# Patient Record
Sex: FEMALE | Employment: UNEMPLOYED | URBAN - METROPOLITAN AREA
[De-identification: names, ages, dates, MRNs, and addresses within clinical notes are randomized per-mention and may not be internally consistent; named-entity substitution may affect disease eponyms.]

---

## 2021-11-01 PROBLEM — F41.9 ANXIETY: Status: RESOLVED | Noted: 2017-12-12 | Resolved: 2021-11-01

## 2021-11-01 PROBLEM — O98.512 COVID-19 AFFECTING PREGNANCY IN SECOND TRIMESTER: Status: ACTIVE | Noted: 2021-11-01

## 2021-11-01 PROBLEM — U07.1 COVID-19 AFFECTING PREGNANCY IN SECOND TRIMESTER: Status: ACTIVE | Noted: 2021-11-01

## 2021-11-01 PROBLEM — F41.9 ANXIETY: Status: ACTIVE | Noted: 2017-12-12

## 2021-11-15 PROBLEM — O09.90 SUPERVISION OF HIGH-RISK PREGNANCY: Status: ACTIVE | Noted: 2021-11-15

## 2021-11-18 PROBLEM — O43.192 MARGINAL INSERTION OF UMBILICAL CORD AFFECTING MANAGEMENT OF MOTHER IN SECOND TRIMESTER: Status: ACTIVE | Noted: 2021-11-18

## 2021-12-29 PROBLEM — O99.212 OBESITY AFFECTING PREGNANCY IN SECOND TRIMESTER: Status: ACTIVE | Noted: 2021-12-29

## 2021-12-30 PROBLEM — O40.2XX0 POLYHYDRAMNIOS IN SECOND TRIMESTER: Status: ACTIVE | Noted: 2021-12-30

## 2021-12-30 PROBLEM — O09.92 SUPERVISION OF HIGH RISK PREGNANCY IN SECOND TRIMESTER: Status: ACTIVE | Noted: 2021-11-15

## 2022-04-14 ENCOUNTER — HOSPITAL ENCOUNTER (INPATIENT)
Age: 32
LOS: 2 days | Discharge: HOME | DRG: 541 | End: 2022-04-16
Attending: OBSTETRICS & GYNECOLOGY | Admitting: OBSTETRICS & GYNECOLOGY
Payer: MEDICAID

## 2022-04-14 ENCOUNTER — ANESTHESIA EVENT (OUTPATIENT)
Dept: LABOR AND DELIVERY | Age: 32
DRG: 541 | End: 2022-04-14
Payer: MEDICAID

## 2022-04-14 ENCOUNTER — ANESTHESIA (OUTPATIENT)
Dept: LABOR AND DELIVERY | Age: 32
DRG: 541 | End: 2022-04-14
Payer: MEDICAID

## 2022-04-14 DIAGNOSIS — O40.2XX0 POLYHYDRAMNIOS IN SECOND TRIMESTER, SINGLE OR UNSPECIFIED FETUS: ICD-10-CM

## 2022-04-14 DIAGNOSIS — Z3A.41 41 WEEKS GESTATION OF PREGNANCY: ICD-10-CM

## 2022-04-14 DIAGNOSIS — O48.0 41 WEEKS GESTATION OF PREGNANCY: ICD-10-CM

## 2022-04-14 DIAGNOSIS — Z34.90 ENCOUNTER FOR INDUCTION OF LABOR: ICD-10-CM

## 2022-04-14 DIAGNOSIS — O09.92 SUPERVISION OF HIGH RISK PREGNANCY IN SECOND TRIMESTER: ICD-10-CM

## 2022-04-14 DIAGNOSIS — O99.212 OBESITY AFFECTING PREGNANCY IN SECOND TRIMESTER: ICD-10-CM

## 2022-04-14 LAB
ALBUMIN SERPL-MCNC: 2.5 G/DL (ref 3.5–5)
ALBUMIN/GLOB SERPL: 0.7 {RATIO} (ref 1.2–3.5)
ALP SERPL-CCNC: 132 U/L (ref 50–130)
ALT SERPL-CCNC: 24 U/L (ref 12–65)
ANION GAP SERPL CALC-SCNC: 12 MMOL/L (ref 7–16)
APTT PPP: 24.8 SEC (ref 24.1–35.1)
AST SERPL-CCNC: 25 U/L (ref 15–37)
BASOPHILS # BLD: 0 K/UL (ref 0–0.2)
BASOPHILS NFR BLD: 0 % (ref 0–2)
BILIRUB SERPL-MCNC: 0.4 MG/DL (ref 0.2–1.1)
BUN SERPL-MCNC: 8 MG/DL (ref 6–23)
CALCIUM SERPL-MCNC: 8.3 MG/DL (ref 8.3–10.4)
CHLORIDE SERPL-SCNC: 108 MMOL/L (ref 98–107)
CITRATED FUNCTIONAL FIBRINOGEN MAXIMUM AMPLITUDE: 17 MM (ref 15–32)
CITRATED KAOLIN LY30: 0 % (ref 0–2.6)
CITRATED KAOLIN R-TIME: 2.2 MINS (ref 4.6–9.1)
CITRATED RAPIDTEG MAXIMUM AMPLITUDE: 60.2 MM (ref 52–70)
CO2 SERPL-SCNC: 19 MMOL/L (ref 21–32)
CREAT SERPL-MCNC: 0.61 MG/DL (ref 0.6–1)
D DIMER PPP FEU-MCNC: 8.44 UG/ML(FEU)
DIFFERENTIAL METHOD BLD: ABNORMAL
EOSINOPHIL # BLD: 0 K/UL (ref 0–0.8)
EOSINOPHIL NFR BLD: 0 % (ref 0.5–7.8)
ERYTHROCYTE [DISTWIDTH] IN BLOOD BY AUTOMATED COUNT: 18.4 % (ref 11.9–14.6)
ERYTHROCYTE [DISTWIDTH] IN BLOOD BY AUTOMATED COUNT: 18.5 % (ref 11.9–14.6)
FIBRINOGEN PPP-MCNC: 289 MG/DL (ref 190–501)
GLOBULIN SER CALC-MCNC: 3.7 G/DL (ref 2.3–3.5)
GLUCOSE SERPL-MCNC: 113 MG/DL (ref 65–100)
HCT VFR BLD AUTO: 35 % (ref 35.8–46.3)
HCT VFR BLD AUTO: 36.8 % (ref 35.8–46.3)
HGB BLD-MCNC: 11.2 G/DL (ref 11.7–15.4)
HGB BLD-MCNC: 11.6 G/DL (ref 11.7–15.4)
HISTORY CHECKED?,CKHIST: NORMAL
IMM GRANULOCYTES # BLD AUTO: 0.2 K/UL (ref 0–0.5)
IMM GRANULOCYTES NFR BLD AUTO: 1 % (ref 0–5)
INR PPP: 0.9
LYMPHOCYTES # BLD: 1 K/UL (ref 0.5–4.6)
LYMPHOCYTES NFR BLD: 7 % (ref 13–44)
MCH RBC QN AUTO: 25.6 PG (ref 26.1–32.9)
MCH RBC QN AUTO: 25.7 PG (ref 26.1–32.9)
MCHC RBC AUTO-ENTMCNC: 31.5 G/DL (ref 31.4–35)
MCHC RBC AUTO-ENTMCNC: 32 G/DL (ref 31.4–35)
MCV RBC AUTO: 80.1 FL (ref 79.6–97.8)
MCV RBC AUTO: 81.4 FL (ref 79.6–97.8)
MONOCYTES # BLD: 0.9 K/UL (ref 0.1–1.3)
MONOCYTES NFR BLD: 6 % (ref 4–12)
NEUTS SEG # BLD: 12.2 K/UL (ref 1.7–8.2)
NEUTS SEG NFR BLD: 85 % (ref 43–78)
NRBC # BLD: 0 K/UL (ref 0–0.2)
NRBC # BLD: 0 K/UL (ref 0–0.2)
PLATELET # BLD AUTO: 218 K/UL (ref 150–450)
PLATELET # BLD AUTO: 233 K/UL (ref 150–450)
PMV BLD AUTO: 11.5 FL (ref 9.4–12.3)
PMV BLD AUTO: 11.6 FL (ref 9.4–12.3)
POTASSIUM SERPL-SCNC: 3.5 MMOL/L (ref 3.5–5.1)
PROT SERPL-MCNC: 6.2 G/DL (ref 6.3–8.2)
PROTHROMBIN TIME: 12.5 SEC (ref 12.6–14.5)
RBC # BLD AUTO: 4.37 M/UL (ref 4.05–5.2)
RBC # BLD AUTO: 4.52 M/UL (ref 4.05–5.2)
SODIUM SERPL-SCNC: 139 MMOL/L (ref 136–145)
WBC # BLD AUTO: 14.3 K/UL (ref 4.3–11.1)
WBC # BLD AUTO: 9.4 K/UL (ref 4.3–11.1)

## 2022-04-14 PROCEDURE — 86900 BLOOD TYPING SEROLOGIC ABO: CPT

## 2022-04-14 PROCEDURE — 0W3R7ZZ CONTROL BLEEDING IN GENITOURINARY TRACT, VIA NATURAL OR ARTIFICIAL OPENING: ICD-10-PCS | Performed by: OBSTETRICS & GYNECOLOGY

## 2022-04-14 PROCEDURE — 75410000003 HC RECOV DEL/VAG/CSECN EA 0.5 HR

## 2022-04-14 PROCEDURE — 76060000032 HC ANESTHESIA 0.5 TO 1 HR: Performed by: OBSTETRICS & GYNECOLOGY

## 2022-04-14 PROCEDURE — 36415 COLL VENOUS BLD VENIPUNCTURE: CPT

## 2022-04-14 PROCEDURE — 85384 FIBRINOGEN ACTIVITY: CPT

## 2022-04-14 PROCEDURE — 85025 COMPLETE CBC W/AUTO DIFF WBC: CPT

## 2022-04-14 PROCEDURE — 85610 PROTHROMBIN TIME: CPT

## 2022-04-14 PROCEDURE — 76010000389 HC LDRP PROCEDURE 0.5 TO 1 HR: Performed by: OBSTETRICS & GYNECOLOGY

## 2022-04-14 PROCEDURE — 77030005537 HC CATH URETH BARD -A: Performed by: OBSTETRICS & GYNECOLOGY

## 2022-04-14 PROCEDURE — 65270000029 HC RM PRIVATE

## 2022-04-14 PROCEDURE — 2709999900 HC NON-CHARGEABLE SUPPLY

## 2022-04-14 PROCEDURE — 85027 COMPLETE CBC AUTOMATED: CPT

## 2022-04-14 PROCEDURE — 74011000250 HC RX REV CODE- 250: Performed by: OBSTETRICS & GYNECOLOGY

## 2022-04-14 PROCEDURE — 80053 COMPREHEN METABOLIC PANEL: CPT

## 2022-04-14 PROCEDURE — 74011250636 HC RX REV CODE- 250/636: Performed by: NURSE ANESTHETIST, CERTIFIED REGISTERED

## 2022-04-14 PROCEDURE — 59409 OBSTETRICAL CARE: CPT | Performed by: OBSTETRICS & GYNECOLOGY

## 2022-04-14 PROCEDURE — 74011000258 HC RX REV CODE- 258: Performed by: OBSTETRICS & GYNECOLOGY

## 2022-04-14 PROCEDURE — 85730 THROMBOPLASTIN TIME PARTIAL: CPT

## 2022-04-14 PROCEDURE — 77030011945 HC CATH URIN INT ST MENT -A: Performed by: OBSTETRICS & GYNECOLOGY

## 2022-04-14 PROCEDURE — 75410000002 HC LABOR FEE PER 1 HR

## 2022-04-14 PROCEDURE — 85379 FIBRIN DEGRADATION QUANT: CPT

## 2022-04-14 PROCEDURE — 75410000000 HC DELIVERY VAGINAL/SINGLE

## 2022-04-14 PROCEDURE — 3E033VJ INTRODUCTION OF OTHER HORMONE INTO PERIPHERAL VEIN, PERCUTANEOUS APPROACH: ICD-10-PCS | Performed by: OBSTETRICS & GYNECOLOGY

## 2022-04-14 PROCEDURE — 85347 COAGULATION TIME ACTIVATED: CPT

## 2022-04-14 PROCEDURE — 10D17Z9 MANUAL EXTRACTION OF PRODUCTS OF CONCEPTION, RETAINED, VIA NATURAL OR ARTIFICIAL OPENING: ICD-10-PCS | Performed by: OBSTETRICS & GYNECOLOGY

## 2022-04-14 PROCEDURE — C1726 CATH, BAL DIL, NON-VASCULAR: HCPCS | Performed by: OBSTETRICS & GYNECOLOGY

## 2022-04-14 PROCEDURE — 77030018846 HC SOL IRR STRL H20 ICUM -A: Performed by: OBSTETRICS & GYNECOLOGY

## 2022-04-14 PROCEDURE — 74011000250 HC RX REV CODE- 250: Performed by: NURSE ANESTHETIST, CERTIFIED REGISTERED

## 2022-04-14 PROCEDURE — 2709999900 HC NON-CHARGEABLE SUPPLY: Performed by: OBSTETRICS & GYNECOLOGY

## 2022-04-14 PROCEDURE — 59160 D & C AFTER DELIVERY: CPT | Performed by: OBSTETRICS & GYNECOLOGY

## 2022-04-14 PROCEDURE — 77030002888 HC SUT CHRMC J&J -A: Performed by: OBSTETRICS & GYNECOLOGY

## 2022-04-14 PROCEDURE — 74011250637 HC RX REV CODE- 250/637: Performed by: OBSTETRICS & GYNECOLOGY

## 2022-04-14 PROCEDURE — 86923 COMPATIBILITY TEST ELECTRIC: CPT

## 2022-04-14 PROCEDURE — 76210000064 HC RECOV POST SURG EA 0.5 HR: Performed by: OBSTETRICS & GYNECOLOGY

## 2022-04-14 PROCEDURE — 74011250636 HC RX REV CODE- 250/636: Performed by: OBSTETRICS & GYNECOLOGY

## 2022-04-14 PROCEDURE — 77030009413 HC ELECTRD SCALP COVD -A: Performed by: OBSTETRICS & GYNECOLOGY

## 2022-04-14 PROCEDURE — 77030005518 HC CATH URETH FOL 2W BARD -B: Performed by: OBSTETRICS & GYNECOLOGY

## 2022-04-14 PROCEDURE — 0KQM0ZZ REPAIR PERINEUM MUSCLE, OPEN APPROACH: ICD-10-PCS | Performed by: OBSTETRICS & GYNECOLOGY

## 2022-04-14 PROCEDURE — 77030018836 HC SOL IRR NACL ICUM -A: Performed by: OBSTETRICS & GYNECOLOGY

## 2022-04-14 RX ORDER — OXYTOCIN/RINGER'S LACTATE 30/500 ML
87.3 PLASTIC BAG, INJECTION (ML) INTRAVENOUS AS NEEDED
Status: DISCONTINUED | OUTPATIENT
Start: 2022-04-14 | End: 2022-04-16 | Stop reason: ALTCHOICE

## 2022-04-14 RX ORDER — CEFAZOLIN SODIUM/WATER 2 G/20 ML
2 SYRINGE (ML) INTRAVENOUS EVERY 8 HOURS
Status: CANCELLED | OUTPATIENT
Start: 2022-04-14 | End: 2022-04-15

## 2022-04-14 RX ORDER — PROPOFOL 10 MG/ML
INJECTION, EMULSION INTRAVENOUS AS NEEDED
Status: DISCONTINUED | OUTPATIENT
Start: 2022-04-14 | End: 2022-04-14 | Stop reason: HOSPADM

## 2022-04-14 RX ORDER — OXYTOCIN/RINGER'S LACTATE 30/500 ML
10 PLASTIC BAG, INJECTION (ML) INTRAVENOUS AS NEEDED
Status: DISCONTINUED | OUTPATIENT
Start: 2022-04-14 | End: 2022-04-15 | Stop reason: SDUPTHER

## 2022-04-14 RX ORDER — ALBUMIN HUMAN 50 G/1000ML
25 SOLUTION INTRAVENOUS AS NEEDED
Status: DISPENSED | OUTPATIENT
Start: 2022-04-14 | End: 2022-04-14

## 2022-04-14 RX ORDER — DOCUSATE SODIUM 100 MG/1
100 CAPSULE, LIQUID FILLED ORAL 2 TIMES DAILY
Status: CANCELLED | OUTPATIENT
Start: 2022-04-14

## 2022-04-14 RX ORDER — SODIUM CHLORIDE, SODIUM LACTATE, POTASSIUM CHLORIDE, CALCIUM CHLORIDE 600; 310; 30; 20 MG/100ML; MG/100ML; MG/100ML; MG/100ML
INJECTION, SOLUTION INTRAVENOUS
Status: DISCONTINUED | OUTPATIENT
Start: 2022-04-14 | End: 2022-04-14 | Stop reason: HOSPADM

## 2022-04-14 RX ORDER — METHYLERGONOVINE MALEATE 0.2 MG/ML
0.2 INJECTION INTRAVENOUS ONCE
Status: COMPLETED | OUTPATIENT
Start: 2022-04-14 | End: 2022-04-14

## 2022-04-14 RX ORDER — MISOPROSTOL 100 UG/1
200 TABLET ORAL EVERY 4 HOURS
Status: CANCELLED | OUTPATIENT
Start: 2022-04-14 | End: 2022-04-15

## 2022-04-14 RX ORDER — BUTORPHANOL TARTRATE 2 MG/ML
1 INJECTION INTRAMUSCULAR; INTRAVENOUS
Status: DISCONTINUED | OUTPATIENT
Start: 2022-04-14 | End: 2022-04-14 | Stop reason: HOSPADM

## 2022-04-14 RX ORDER — OXYTOCIN/RINGER'S LACTATE 30/500 ML
87.3 PLASTIC BAG, INJECTION (ML) INTRAVENOUS AS NEEDED
Status: DISCONTINUED | OUTPATIENT
Start: 2022-04-14 | End: 2022-04-15 | Stop reason: SDUPTHER

## 2022-04-14 RX ORDER — MISOPROSTOL 200 UG/1
TABLET ORAL
Status: ACTIVE
Start: 2022-04-14 | End: 2022-04-15

## 2022-04-14 RX ORDER — SODIUM CHLORIDE 0.9 % (FLUSH) 0.9 %
5 SYRINGE (ML) INJECTION AS NEEDED
Status: DISCONTINUED | OUTPATIENT
Start: 2022-04-14 | End: 2022-04-16 | Stop reason: ALTCHOICE

## 2022-04-14 RX ORDER — HYDROCODONE BITARTRATE AND ACETAMINOPHEN 5; 325 MG/1; MG/1
1 TABLET ORAL
Status: DISCONTINUED | OUTPATIENT
Start: 2022-04-14 | End: 2022-04-16 | Stop reason: HOSPADM

## 2022-04-14 RX ORDER — LIDOCAINE HYDROCHLORIDE 20 MG/ML
INJECTION, SOLUTION EPIDURAL; INFILTRATION; INTRACAUDAL; PERINEURAL AS NEEDED
Status: DISCONTINUED | OUTPATIENT
Start: 2022-04-14 | End: 2022-04-14 | Stop reason: HOSPADM

## 2022-04-14 RX ORDER — MIDAZOLAM HYDROCHLORIDE 1 MG/ML
INJECTION, SOLUTION INTRAMUSCULAR; INTRAVENOUS AS NEEDED
Status: DISCONTINUED | OUTPATIENT
Start: 2022-04-14 | End: 2022-04-14 | Stop reason: HOSPADM

## 2022-04-14 RX ORDER — ONDANSETRON 4 MG/1
8 TABLET, ORALLY DISINTEGRATING ORAL
Status: CANCELLED | OUTPATIENT
Start: 2022-04-14

## 2022-04-14 RX ORDER — OXYTOCIN/RINGER'S LACTATE 30/500 ML
999 PLASTIC BAG, INJECTION (ML) INTRAVENOUS ONCE
Status: DISCONTINUED | OUTPATIENT
Start: 2022-04-14 | End: 2022-04-15 | Stop reason: ALTCHOICE

## 2022-04-14 RX ORDER — OXYTOCIN/RINGER'S LACTATE 30/500 ML
10 PLASTIC BAG, INJECTION (ML) INTRAVENOUS AS NEEDED
Status: DISCONTINUED | OUTPATIENT
Start: 2022-04-14 | End: 2022-04-16 | Stop reason: ALTCHOICE

## 2022-04-14 RX ORDER — SODIUM CHLORIDE 0.9 % (FLUSH) 0.9 %
5-40 SYRINGE (ML) INJECTION EVERY 8 HOURS
Status: DISCONTINUED | OUTPATIENT
Start: 2022-04-14 | End: 2022-04-16 | Stop reason: ALTCHOICE

## 2022-04-14 RX ORDER — MINERAL OIL
120 OIL (ML) ORAL
Status: DISCONTINUED | OUTPATIENT
Start: 2022-04-14 | End: 2022-04-14 | Stop reason: HOSPADM

## 2022-04-14 RX ORDER — LIDOCAINE HYDROCHLORIDE 20 MG/ML
JELLY TOPICAL
Status: DISCONTINUED | OUTPATIENT
Start: 2022-04-14 | End: 2022-04-14 | Stop reason: HOSPADM

## 2022-04-14 RX ORDER — SODIUM CHLORIDE 0.9 % (FLUSH) 0.9 %
5-40 SYRINGE (ML) INJECTION AS NEEDED
Status: DISCONTINUED | OUTPATIENT
Start: 2022-04-14 | End: 2022-04-16 | Stop reason: ALTCHOICE

## 2022-04-14 RX ORDER — METHYLERGONOVINE MALEATE 0.2 MG/ML
INJECTION INTRAVENOUS
Status: ACTIVE
Start: 2022-04-14 | End: 2022-04-15

## 2022-04-14 RX ORDER — ZOLPIDEM TARTRATE 5 MG/1
5 TABLET ORAL
Status: CANCELLED | OUTPATIENT
Start: 2022-04-14

## 2022-04-14 RX ORDER — DEXTROSE, SODIUM CHLORIDE, SODIUM LACTATE, POTASSIUM CHLORIDE, AND CALCIUM CHLORIDE 5; .6; .31; .03; .02 G/100ML; G/100ML; G/100ML; G/100ML; G/100ML
125 INJECTION, SOLUTION INTRAVENOUS CONTINUOUS
Status: DISCONTINUED | OUTPATIENT
Start: 2022-04-14 | End: 2022-04-15

## 2022-04-14 RX ORDER — IBUPROFEN 800 MG/1
800 TABLET ORAL EVERY 6 HOURS
Status: DISCONTINUED | OUTPATIENT
Start: 2022-04-14 | End: 2022-04-16 | Stop reason: HOSPADM

## 2022-04-14 RX ORDER — SODIUM CHLORIDE 9 MG/ML
250 INJECTION, SOLUTION INTRAVENOUS AS NEEDED
Status: DISCONTINUED | OUTPATIENT
Start: 2022-04-14 | End: 2022-04-16 | Stop reason: ALTCHOICE

## 2022-04-14 RX ORDER — OXYTOCIN/RINGER'S LACTATE 30/500 ML
0-20 PLASTIC BAG, INJECTION (ML) INTRAVENOUS
Status: DISCONTINUED | OUTPATIENT
Start: 2022-04-14 | End: 2022-04-15

## 2022-04-14 RX ORDER — NALOXONE HYDROCHLORIDE 0.4 MG/ML
0.4 INJECTION, SOLUTION INTRAMUSCULAR; INTRAVENOUS; SUBCUTANEOUS AS NEEDED
Status: CANCELLED | OUTPATIENT
Start: 2022-04-14

## 2022-04-14 RX ORDER — DIPHENHYDRAMINE HCL 25 MG
25 CAPSULE ORAL
Status: CANCELLED | OUTPATIENT
Start: 2022-04-14

## 2022-04-14 RX ORDER — LIDOCAINE HYDROCHLORIDE 10 MG/ML
1 INJECTION INFILTRATION; PERINEURAL
Status: DISCONTINUED | OUTPATIENT
Start: 2022-04-14 | End: 2022-04-14 | Stop reason: HOSPADM

## 2022-04-14 RX ORDER — MISOPROSTOL 200 UG/1
800 TABLET ORAL ONCE
Status: COMPLETED | OUTPATIENT
Start: 2022-04-14 | End: 2022-04-14

## 2022-04-14 RX ADMIN — MIDAZOLAM 2 MG: 1 INJECTION INTRAMUSCULAR; INTRAVENOUS at 18:00

## 2022-04-14 RX ADMIN — Medication 1000 MG: at 17:46

## 2022-04-14 RX ADMIN — PROPOFOL 50 MG: 10 INJECTION, EMULSION INTRAVENOUS at 18:11

## 2022-04-14 RX ADMIN — SODIUM CHLORIDE 2.5 MILLION UNITS: 9 INJECTION, SOLUTION INTRAVENOUS at 14:19

## 2022-04-14 RX ADMIN — SODIUM CHLORIDE 5 MILLION UNITS: 900 INJECTION INTRAVENOUS at 10:26

## 2022-04-14 RX ADMIN — METHYLERGONOVINE MALEATE 0.2 MG: 0.2 INJECTION, SOLUTION INTRAMUSCULAR; INTRAVENOUS at 17:40

## 2022-04-14 RX ADMIN — SODIUM CHLORIDE, SODIUM LACTATE, POTASSIUM CHLORIDE, CALCIUM CHLORIDE, AND DEXTROSE MONOHYDRATE 125 ML/HR: 600; 310; 30; 20; 5 INJECTION, SOLUTION INTRAVENOUS at 08:26

## 2022-04-14 RX ADMIN — PROPOFOL 50 MG: 10 INJECTION, EMULSION INTRAVENOUS at 18:08

## 2022-04-14 RX ADMIN — Medication 1 MILLI-UNITS/MIN: at 08:29

## 2022-04-14 RX ADMIN — PROPOFOL 50 MG: 10 INJECTION, EMULSION INTRAVENOUS at 18:04

## 2022-04-14 RX ADMIN — PROPOFOL 50 MG: 10 INJECTION, EMULSION INTRAVENOUS at 18:01

## 2022-04-14 RX ADMIN — LIDOCAINE HYDROCHLORIDE 60 MG: 20 INJECTION, SOLUTION EPIDURAL; INFILTRATION; INTRACAUDAL; PERINEURAL at 17:58

## 2022-04-14 RX ADMIN — PROPOFOL 50 MG: 10 INJECTION, EMULSION INTRAVENOUS at 18:06

## 2022-04-14 RX ADMIN — IBUPROFEN 800 MG: 800 TABLET ORAL at 21:35

## 2022-04-14 RX ADMIN — SODIUM CHLORIDE, SODIUM LACTATE, POTASSIUM CHLORIDE, AND CALCIUM CHLORIDE: 600; 310; 30; 20 INJECTION, SOLUTION INTRAVENOUS at 17:51

## 2022-04-14 RX ADMIN — SODIUM CHLORIDE, SODIUM LACTATE, POTASSIUM CHLORIDE, AND CALCIUM CHLORIDE 500 ML: 600; 310; 30; 20 INJECTION, SOLUTION INTRAVENOUS at 14:50

## 2022-04-14 RX ADMIN — MISOPROSTOL 800 MCG: 200 TABLET ORAL at 17:43

## 2022-04-14 NOTE — PROGRESS NOTES
Admission Note    Pt admitted to Room 433. Admission assessment completed. Discussed plan of care with patient. IV started, Consents witnessed. Lab work drawn, sent to lab. Reviewed \"pain goal\" with patient, explaining realistic expectations for pain relief.

## 2022-04-14 NOTE — ANESTHESIA PREPROCEDURE EVALUATION
Anesthetic History   No history of anesthetic complications            Review of Systems / Medical History  Patient summary reviewed and pertinent labs reviewed    Pulmonary            Asthma (Childhood)        Neuro/Psych         Psychiatric history (Anxiety)     Cardiovascular  Within defined limits                Exercise tolerance: >4 METS     GI/Hepatic/Renal  Within defined limits              Endo/Other        Obesity and anemia (Acute blood loss)     Other Findings              Physical Exam    Airway  Mallampati: I  TM Distance: > 6 cm  Neck ROM: normal range of motion   Mouth opening: Normal     Cardiovascular  Regular rate and rhythm,  S1 and S2 normal,  no murmur, click, rub, or gallop             Dental  No notable dental hx       Pulmonary  Breath sounds clear to auscultation               Abdominal  GI exam deferred       Other Findings            Anesthetic Plan    ASA: 3, emergent  Anesthesia type: total IV anesthesia          Induction: Intravenous  Anesthetic plan and risks discussed with: Patient and Spouse      Called to MARIE Level 1. Patient had  with PPH due to uterine atony. She did not have an epidural for delivery and obstetrician requested sedation so that she could place a Bakiri Balloon and repair a laceration. EBL  Pitocin and Methergin had been given. I ordered TXA as well as a T&C. Patient was quickly transferred to the OR for further resuscitation and treatment. EBL was about 600cc at the time she was moved to the OR. See intraoperative record for details.

## 2022-04-14 NOTE — OP NOTES
FULL OP NOTE      Elysa Kussmaul  [id number]    DATE OF PROCEDURE:  4/14/2022    PREOPERATIVE DIAGNOSIS:  PP hemorrhage immediately after vaginal delivery    POSTOPERATIVE DIAGNOSIS:  Uterine atomy    PROCEDURE: EUA, banjo curettage, bakri placement     SURGEON:  Christina Hernandez MD    ANESTHESIA: General endotracheal anesthesia. EBL: see anesthesia or nursing record    SPECIMEN: none    DESCRIPTION OF PROCEDURE: The patient was taken to the OR for anesthesia so an adequate exam could be done and the bleeding attended to. See her delivery note. Once we had anesthesia and pt relaxation, the situation could be assessed. Pt had been given one dose methergine IM, cytotec 800mcg po, TXA 1gm IV just prior to going to OR. She was placed in candy cane stirrups. Her bldg had significantly slowed from what was occurring in the delivery room. The uterus was explored- no retained POC was felt. Banjo curette did not retrieve any POC. There were no vaginal or cervical tears. The uterus had good tone as long as it was massaged, but relaxed somewhat w/o it. Bakri was placed in the uterine cavity. 400cc was placed in the balloon. A vaginal pack was placed, as well as harden. The small 2nd degree perineal tear was repaired with 3-0 chromic in the usual fashion. Pt remained stable throughout the situation. She will be given cytotec and iv ancef for 24hrs. She was reunited with her baby and partner in stable condition.

## 2022-04-14 NOTE — PROGRESS NOTES
Dr. Maura Holt at bedside to assess patient. Strip/FHR reviewed by MD. Orders received to start penicillin for GBS prophylaxis. Last GBS culture taken 5+ weeks ago per MD. See [facility] for details.

## 2022-04-14 NOTE — PROGRESS NOTES
Pt did not want epidural. Difficult to  pt in pushing effectively. Finally told pt the baby was having severe dips in heart rate- variables- and was able to get her to push to . Then head, arm and anterior shoulder delivered easily. Good cry, tone, color on perineum. PP hemorrhage I think due to atony. Have given cytotec and methergine. Need anesthesia. Headed to OR to be able to assess the situation.

## 2022-04-14 NOTE — L&D DELIVERY NOTE
Delivery Summary    Patient: Manish De Jesus MRN: [id number]  SSN: [id number]    YOB: 1990  Age: 28 y.o. Sex: female       Information for the patient's :  Ernie Pink [[id number]]       Labor Events:    Labor: No    Steroids: None   Cervical Ripening Date/Time:       Cervical Ripening Type: None   Antibiotics During Labor: Yes   Rupture Identifier:      Rupture Date/Time: 2022 2:50 PM   Rupture Type:     Amniotic Fluid Volume: Moderate    Amniotic Fluid Description: Clear    Amniotic Fluid Odor: None    Induction: Oxytocin       Induction Date/Time:        Indications for Induction: Post-term Gestation    Augmentation: None   Augmentation Date/Time:      Indications for Augmentation:     Labor complications: Other (comment)   category 2 strip at times with periods of decreased variability, and repetitive severe variables with pushing   Additional complications:        Delivery Events:  Indications For Episiotomy:     Episiotomy: None   Perineal Laceration(s): 2nd   Repaired:     Periurethral Laceration Location:      Repaired:     Labial Laceration Location:     Repaired:     Sulcal Laceration Location:     Repaired:     Vaginal Laceration Location:     Repaired:     Cervical Laceration Location:     Repaired:     Repair Suture: Chromic 3-0   Number of Repair Packets: 1   Estimated Blood Loss (ml):  ml   Quantitative Blood Loss (ml)                Delivery Date: 2022    Delivery Time: 5:33 PM  Delivery Type: Vaginal, Spontaneous  Sex:  Male    Gestational Age: 40w1d   Delivery Clinician:  Tony Kasper  Living Status: Living   Delivery Location: L&D            APGARS  One minute Five minutes Ten minutes   Skin color: 0   1        Heart rate: 2   2        Grimace: 2   2        Muscle tone: 2   2        Breathin   2        Totals: 8   9            Presentation: Vertex    Position:   Occiput Anterior  Resuscitation Method:  Suctioning-bulb; Tactile Stimulation     Meconium Stained:        Cord Information: 3 Vessels  Complications: None  Cord around:    Delayed cord clamping? Yes  Cord clamped date/time:   Disposition of Cord Blood: Lab    Blood Gases Sent?: No    Placenta:  Date/Time:    Removal: Expressed      Appearance: Normal;Intact     [place] Measurements:  Birth Weight: 8 lb 10.3 oz (3.92 kg)      Birth Length: 55 cm      Head Circumference: 37 cm      Chest Circumference:       Abdominal Girth: Other Providers:   Ramya PELAEZ;JJ TAYLOR;;RASHIDA ESCALERA;SANA HERRERA;JOAN GUZMAN;, Obstetrician;Primary Nurse;Primary  Nurse;Staff Nurse;Scrub Tech;Charge Nurse;Neonatologist           Group B Strep: No results found for: GRBSEXT, GRBSEXT  Information for the patient's :  Jazmin Echols [[id number]]   No results found for: ABORH, PCTABR, PCTDIG, BILI, ABORHEXT, ABORH     No results for input(s): PCO2CB, PO2CB, HCO3I, SO2I, IBD, PTEMPI, SPECTI, PHICB, ISITE, IDEV, IALLEN in the last 72 hours. Delivery c/b difficulty getting pt to push effectively, nuchal arm, heavy bright red bldg that started before delivery of placenta. Uterine atony. Unable to adequately assess situation due to no epidural. Pt taken to OR for EUA, banjo curettage, placement of bakri, repair of small 2nd degree tear.

## 2022-04-14 NOTE — H&P
History & Physical    Name: Lee Stover MRN: [id number]  SSN: [id number]    YOB: 1990  Age: 28 y.o. Sex: female      Subjective:     Estimated Date of Delivery: 22  OB History    Para Term  AB Living   2 1 1     1   SAB IAB Ectopic Molar Multiple Live Births             1      # Outcome Date GA Lbr Osiel/2nd Weight Sex Delivery Anes PTL Lv   2 Current            1 Term 07/15/18 41w2d / 01:38 8 lb 0.6 oz (3.647 kg) F Vag-Spont None N KERWIN       Ms. Chelsea Jo is admitted with pregnancy at 41w1d for induction of labor due to favorable cervix at term. Prenatal course was complicated by marginal cord insertion, covid 19 in pregnancy. Please see prenatal records for details. Past Medical History:   Diagnosis Date    Anxiety 2017    Formatting of this note might be different from the original. No meds   Last Assessment & Plan:  Formatting of this note might be different from the original. No meds. Mood continues to be stable.  Asthma     childhood    Immune to varicella     Vasa previa affecting labor and delivery in fournier pregnancy 2021 at [facility]- Abnormal placentation with margin cord insertion at cephalad portion of posterior placenta but lower edge extends to internal os with large dilated vessels covering internal os, possibly fetal.  Will manage as Vasa Previa. 2021 at [facility]: RESOLVED. Placenta more than 6 cm from OS        No past surgical history on file.   Social History     Occupational History    Not on file   Tobacco Use    Smoking status: Never Smoker    Smokeless tobacco: Never Used   Substance and Sexual Activity    Alcohol use: Not Currently    Drug use: Never    Sexual activity: Yes     Partners: Male     Birth control/protection: None     Family History   Problem Relation Age of Onset    Diabetes Neg Hx     Hypertension Neg Hx        No Known Allergies  Prior to Admission medications    Medication Sig Start Date End Date Taking? Authorizing Provider   prenatal multivit-ca-min-fe-fa tab Take  by mouth. Provider, Historical        Review of Systems: Pertinent items are noted in the History of Present Illness. Objective:     Vitals: There were no vitals filed for this visit. Physical Exam:  Patient without distress. Heart: Regular rate and rhythm  Lung: clear to auscultation throughout lung fields, no wheezes, no rales, no rhonchi and normal respiratory effort  Membranes:  Intact  Fetal Heart Rate: Reactive          Prenatal Labs:   No results found for: ABORH, RUBELLAEXT, HBSAGEXT, HIVEXT, RPREXT, GONNOEXT, CHLAMEXT, ABORHEXT, RUBELLAEXT, GRBSEXT, HBSAGEXT, HIVEXT, RPREXT, GONNOEXT, CHLAMEXT    Impression/Plan:     Principal Problem:    Encounter for induction of labor (4/14/2022)    Active Problems:    COVID-19 affecting pregnancy in second trimester (11/1/2021)      Overview: 11/1/2021 Covid Day 6. OB notified us s/s of COVID 19 began 10/27/21 and +       test on 10/29/21.            11/18/2021 at [facility]- Still with some loss of taste and smell. She should       not have any placental or fetal affect of Covid infection. Supervision of high risk pregnancy in second trimester (11/15/2021)      Overview: Dated by 8 week US done at [employer] testing/carrier screening       COVID Vaccine ___      Flu Vaccine  ____      Declines TDAP      Plan anatomy scan with MFM due to Francie during pregnancy            11/18/2021 at [facility]: Normal anatomy/echo; AC 89%, ADY WNL, CL 3.5 cm TV. Vasa previa seen today. Explained findings in detail, including risk of       painless bleeding and need for pelvic rest. Could resolve spontaneously. 12/30/2021 at [facility]: Vasa previa resolved, normal posterior placenta,       Appropriate fetal growth; Polyhydramnios. MCI. AC 77%, Overall 57%, ADY       25 cm.             · No follow up MFM-refer back as needed       · Glucola to be checked at next OB appt Declined TDAP      Desires spontaneous labor over IOL            Marginal insertion of umbilical cord affecting management of mother in second trimester (11/18/2021)      Obesity affecting pregnancy in second trimester (12/29/2021)      41 weeks gestation of pregnancy (4/14/2022)         Plan: Admit for induction of labor. Group B Strep negative. But was over 5wks ago.

## 2022-04-14 NOTE — PROGRESS NOTES
Pt on ball. Feels ctx's, mild    Visit Vitals  /62   Pulse 76   Temp 98.8 °F (37.1 °C)   Resp 17   Ht 5' 1\" (1.549 m)   Wt 198 lb (89.8 kg)   LMP 06/30/2021   SpO2 98%   BMI 37.41 kg/m²     Category 1 strip  cx was 4-5 in office yday    GBS neg but that was >5wks ago  D/w pt use of PCN- she agrees.

## 2022-04-14 NOTE — PROGRESS NOTES
RN at bedside assessing pt. Pt reports increased pain and pressure. SVE offered along with pain relief options. Pt refused both.

## 2022-04-15 LAB
BASOPHILS # BLD: 0 K/UL (ref 0–0.2)
BASOPHILS NFR BLD: 0 % (ref 0–2)
DIFFERENTIAL METHOD BLD: ABNORMAL
EOSINOPHIL # BLD: 0 K/UL (ref 0–0.8)
EOSINOPHIL NFR BLD: 0 % (ref 0.5–7.8)
ERYTHROCYTE [DISTWIDTH] IN BLOOD BY AUTOMATED COUNT: 18.1 % (ref 11.9–14.6)
HCT VFR BLD AUTO: 30.1 % (ref 35.8–46.3)
HGB BLD-MCNC: 9.8 G/DL (ref 11.7–15.4)
IMM GRANULOCYTES # BLD AUTO: 0.2 K/UL (ref 0–0.5)
IMM GRANULOCYTES NFR BLD AUTO: 1 % (ref 0–5)
LYMPHOCYTES # BLD: 1.9 K/UL (ref 0.5–4.6)
LYMPHOCYTES NFR BLD: 12 % (ref 13–44)
MCH RBC QN AUTO: 25.7 PG (ref 26.1–32.9)
MCHC RBC AUTO-ENTMCNC: 32.6 G/DL (ref 31.4–35)
MCV RBC AUTO: 78.8 FL (ref 79.6–97.8)
MONOCYTES # BLD: 1.6 K/UL (ref 0.1–1.3)
MONOCYTES NFR BLD: 11 % (ref 4–12)
NEUTS SEG # BLD: 11.7 K/UL (ref 1.7–8.2)
NEUTS SEG NFR BLD: 76 % (ref 43–78)
NRBC # BLD: 0 K/UL (ref 0–0.2)
PLATELET # BLD AUTO: 207 K/UL (ref 150–450)
PMV BLD AUTO: 11.2 FL (ref 9.4–12.3)
RBC # BLD AUTO: 3.82 M/UL (ref 4.05–5.2)
WBC # BLD AUTO: 15.5 K/UL (ref 4.3–11.1)

## 2022-04-15 PROCEDURE — 74011250637 HC RX REV CODE- 250/637: Performed by: OBSTETRICS & GYNECOLOGY

## 2022-04-15 PROCEDURE — 85025 COMPLETE CBC W/AUTO DIFF WBC: CPT

## 2022-04-15 PROCEDURE — 2709999900 HC NON-CHARGEABLE SUPPLY

## 2022-04-15 PROCEDURE — 77030040361 HC SLV COMPR DVT MDII -B

## 2022-04-15 PROCEDURE — 65270000029 HC RM PRIVATE

## 2022-04-15 PROCEDURE — 36415 COLL VENOUS BLD VENIPUNCTURE: CPT

## 2022-04-15 PROCEDURE — 74011000250 HC RX REV CODE- 250: Performed by: OBSTETRICS & GYNECOLOGY

## 2022-04-15 RX ORDER — MISOPROSTOL 200 UG/1
200 TABLET ORAL EVERY 6 HOURS
Status: COMPLETED | OUTPATIENT
Start: 2022-04-15 | End: 2022-04-16

## 2022-04-15 RX ADMIN — IBUPROFEN 800 MG: 800 TABLET ORAL at 03:33

## 2022-04-15 RX ADMIN — SODIUM CHLORIDE, PRESERVATIVE FREE 10 ML: 5 INJECTION INTRAVENOUS at 00:30

## 2022-04-15 RX ADMIN — MISOPROSTOL 200 MCG: 200 TABLET ORAL at 12:03

## 2022-04-15 RX ADMIN — IBUPROFEN 800 MG: 800 TABLET ORAL at 09:33

## 2022-04-15 RX ADMIN — IBUPROFEN 800 MG: 800 TABLET ORAL at 15:03

## 2022-04-15 RX ADMIN — MISOPROSTOL 200 MCG: 200 TABLET ORAL at 17:59

## 2022-04-15 RX ADMIN — SODIUM CHLORIDE, PRESERVATIVE FREE 10 ML: 5 INJECTION INTRAVENOUS at 15:26

## 2022-04-15 NOTE — LACTATION NOTE

## 2022-04-15 NOTE — PROGRESS NOTES
Shift assessment complete as noted. Patient denies needs. Discussed plan of care for the day. Questions encouraged and answered. Encouraged to call for needs or concerns. Verbalizes understanding.

## 2022-04-15 NOTE — PROGRESS NOTES
Pt in bed with call light in reach. No complaints at this time. Scant blood noted in bakri bag. Pt encouraged to call with needs/concerns. No s/s of distress noted.

## 2022-04-15 NOTE — PROGRESS NOTES
Chart reviewed - no needs identified. SW met with patient while social distancing with appropriate PPE. Patient without a PCP; referral made to [facility] PCP Coordinator. Patient denies any history of postpartum depression/anxiety. Patient given informational packet on  mood & anxiety disorders (resources/education). Family denies any additional needs from  at this time. Family has 's contact information should any needs/questions arise.     VERNON Montes De Oca, [address] [facility]   [phone]

## 2022-04-15 NOTE — PROGRESS NOTES
SBAR OUT Report: Mother    Verbal report given to Amanda Olmos RN (full name & credentials) on this patient, who is now being transferred to MIU (unit) for routine progression of care. The patient is not wearing a green \"Anesthesia-Duramorph\" band. Report consisted of patient's Situation, Background, Assessment and Recommendations (SBAR). [place] ID bands were compared with the identification form, and verified with the patient and receiving nurse. Information from the SBAR, [address] ED Summary and Intake/Output and the [place] Report was reviewed with the receiving nurse; opportunity for questions and clarification provided.

## 2022-04-15 NOTE — LACTATION NOTE
This note was copied from a baby's chart. In to see mom and infant for first time. Baby just finished getting first baby and awake. Mom wanted to try to feed baby, mom in agreeance for feeding observation. Mom's second child, she states she struggled to breast feed first child. Tried first 3 months and did both breast and formula, but baby never breast feed well so she stopped after that time period. She feels already this  is latching and feeding better than first baby. Mom showed lactation tiny pinpoint size scab she had on tip or right nipple from prior bad latch. Reviewed nipple care to prevent infection and promote healing. Also encouraged to alternate breast feeding positions. Offered to show mom football hold as she had only been feeding baby in front. Mom in agreeance. Got baby skin to skin w/ her and assisted in showing her how to bring baby deeply and wide onto right breast in football hold. After a few attempts baby stayed on and fed for 10 minutes before falling asleep. Discussed manual lip flange as needed. Burped infant and assisted her in getting baby onto left breast in football hold . Observed another 5 minutes, and he continued to feed on that side upon lactation exit. During visit answered mom's breast feeding questions on what to look for to make sure baby getting enough food. Reviewed 1st and 2nd 24 hr feeding/output expectations, as well as normalcy of periods of cluster feeding. W/ mom's MARIE and blood loss, reviewed w/ mom should be monitored to see how fast/quck mom's milk comes in as sometimes w/ blood loss it can delay milk coming in. Mom knows to ask for a pump if needed and baby not breast feeding well after 1st 24 hrs.  Lactation to follow up in am.

## 2022-04-15 NOTE — PROGRESS NOTES
1600: FSE placed  1700: SVE performed; pt complete. 1706: Dr. Carey Lyons at bedside; pt begins pushing  [id number]: Delivery of baby  320 2035: Delivery of placenta; verbal orders received to start pitocin at 999ml/hr  1738: Methergine order received from MD  1740: Methergine given   6167: Cytotec order received  8367: MARIE Level 1 called  [id number]: cytotec given  1745: Orders received from MD to take pt to [address]: Anesthesia at bedside; orders received from Dr. Dora Fernandez to give TXA; TXA given  [phone]: Pt in [address]: Timeout performed  1803: Blood brought from blood bank to OR by house supervisor  [phone]: 2nd IV placed by CRNA; Bakri balloon placed  1815:  Le placed; vaginal packing placed; Orders received to return blood to blood bank  1820: D&C complete  1830: Pt out of OR and brought to room to begin recovery

## 2022-04-15 NOTE — PROGRESS NOTES
Admission assessment complete as noted. Patient oriented to room and unit. Plan of care reviewed and patient verbalizes understanding. Questions encouraged and answered. Patent encouraged to call for needs or concerns. Safety Teaching reviewed:   1. Hand hygiene prior to handling the infant. 2. Use of bulb syringe. 3. Bracelets with matching numbers are placed on mother and infant  4. An infant security tag  [facility]) is placed on the infant's ankle and monitored  5. All OB nurses wear pink Employee badges - do not give your baby to anyone without proper identification. 6. Never leave the baby alone in the room. 7. The infant should be placed on their back to sleep. on a firm mattress. No toys should be placed in the crib. (safe sleep video offered to view)  8. Never shake the baby (video offered to view)  9. Infant fall prevention - do not sleep with the baby, and place the baby in the crib while ambulating. 8. Mother and Baby Care booklet given to Mother.

## 2022-04-15 NOTE — ANESTHESIA POSTPROCEDURE EVALUATION
Procedure(s):  DILATATION AND CURETTAGE.    total IV anesthesia    Anesthesia Post Evaluation      Multimodal analgesia: multimodal analgesia used between 6 hours prior to anesthesia start to PACU discharge  Patient location during evaluation: bedside  Patient participation: complete - patient participated  Level of consciousness: awake  Pain management: adequate  Airway patency: patent  Anesthetic complications: no  Cardiovascular status: acceptable  Respiratory status: spontaneous ventilation and acceptable  Hydration status: acceptable  Comments: F/u labs all look good. No coagulopathy. Hgb 11. Post anesthesia nausea and vomiting:  none      INITIAL Post-op Vital signs:   Vitals Value Taken Time   /62 04/14/22 1956   Temp 36.7 °C (98 °F) 04/14/22 1936   Pulse 68 04/14/22 1956   Resp 16 04/14/22 1936   SpO2 84 % 04/14/22 1958   Vitals shown include unvalidated device data.

## 2022-04-15 NOTE — PROGRESS NOTES
SBAR IN Report: Mother    Verbal report received from [facility] (full name & credentials) on this patient, who is now being transferred from L&D (unit) for routine progression of care. The patient is not wearing a green \"Anesthesia-Duramorph\" band. Report consisted of patient's Situation, Background, Assessment and Recommendations (SBAR).  ID bands were compared with the identification form, and verified with the patient and transferring nurse. Information from the SBAR, OR Summary, Intake/Output, MAR and Recent Results and the [place] Report was reviewed with the transferring nurse; opportunity for questions and clarification provided.

## 2022-04-15 NOTE — PROGRESS NOTES
[facility]  [address] [place] [address], [address]  [address] [facility], MD, Urbano Hsieh, [facility]    Patient is S/P vaginal delivery at 39 1/7 weeks, IOL for postdates, PPH. No complaints today. Lochia < menses. No GI/ issues. No F/C.     VITALS  Patient Vitals for the past 24 hrs:   Temp Pulse Resp BP SpO2   04/15/22 0726 98.6 °F (37 °C) 71 17 (!) 102/57 98 %   04/15/22 0415 98.9 °F (37.2 °C) 72 17 (!) 102/56 --   04/15/22 0343 99.3 °F (37.4 °C) 75 18 (!) 96/50 98 %   04/14/22 2344 98.4 °F (36.9 °C) 79 16 114/69 98 %   04/14/22 2337 97.7 °F (36.5 °C) 83 16 108/70 99 %   04/14/22 2242 98.9 °F (37.2 °C) 75 16 (!) 108/55 95 %   04/14/22 2131 -- 84 -- (!) 96/50 --   04/14/22 2116 -- 86 -- (!) 102/49 --   04/14/22 2056 -- 77 -- (!) 95/52 --   04/14/22 2051 -- 80 -- (!) 94/49 --   04/14/22 2046 -- 80 -- (!) 100/53 --   04/14/22 2042 -- 76 -- 92/63 --   04/14/22 2036 -- 83 -- (!) 100/58 --   04/14/22 2031 -- 75 -- (!) 99/56 --   04/14/22 2026 -- 72 -- (!) 104/50 --   04/14/22 2021 -- 76 -- (!) 98/54 --   04/14/22 2016 -- 71 -- (!) 102/55 --   04/14/22 2011 -- 62 -- (!) 100/57 --   04/14/22 1951 -- 67 -- 116/64 --   04/14/22 1946 -- 72 -- (!) 114/58 --   04/14/22 1941 -- 78 -- (!) 123/59 --   04/14/22 1936 98 °F (36.7 °C) 71 16 114/60 90 %   04/14/22 1932 -- 77 -- 130/65 --   04/14/22 1930 -- -- -- -- (!) 77 %   04/14/22 1921 -- 77 -- 119/69 --   04/14/22 1916 -- 69 -- 120/76 --   04/14/22 1911 -- 69 -- 118/70 --   04/14/22 1910 -- -- -- -- (!) 84 %   04/14/22 1906 -- 70 -- 115/69 --   04/14/22 1902 -- 71 -- (!) 119/58 --   04/14/22 1843 -- 82 16 114/64 100 %   04/14/22 1506 -- 82 -- 107/70 --   04/14/22 1459 98.2 °F (36.8 °C) -- -- -- --   04/14/22 1434 -- 67 -- 103/66 --   04/14/22 1404 -- 71 -- 95/62 --   04/14/22 1335 -- 76 -- (!) 97/55 --   04/14/22 1235 -- 75 -- 113/72 --   04/14/22 1232 98.2 °F (36.8 °C) -- -- -- --   04/14/22 1205 -- 76 -- 93/60 --   04/14/22 1135 -- 75 -- 111/66 --   04/14/22 1105 -- 75 -- 104/69 --   04/14/22 1039 -- 70 -- 111/73 --   04/14/22 1004 -- 80 -- -- --        CV - RRR  LUNGS - CTA bilaterally  ABD - soft, approp tend, FF below umbilicus  EXT - tr edema bilaterally          Labs:    Recent Results (from the past 24 hour(s))   RBC, ALLOCATE    Collection Time: 04/14/22  6:00 PM   Result Value Ref Range    HISTORY CHECKED? Historical check performed    METABOLIC PANEL, COMPREHENSIVE    Collection Time: 04/14/22  6:14 PM   Result Value Ref Range    Sodium 139 136 - 145 mmol/L    Potassium 3.5 3.5 - 5.1 mmol/L    Chloride 108 (H) 98 - 107 mmol/L    CO2 19 (L) 21 - 32 mmol/L    Anion gap 12 7 - 16 mmol/L    Glucose 113 (H) 65 - 100 mg/dL    BUN 8 6 - 23 MG/DL    Creatinine 0.61 0.6 - 1.0 MG/DL    GFR est AA >60 >60 ml/min/1.73m2    GFR est non-AA >60 >60 ml/min/1.73m2    Calcium 8.3 8.3 - 10.4 MG/DL    Bilirubin, total 0.4 0.2 - 1.1 MG/DL    ALT (SGPT) 24 12 - 65 U/L    AST (SGOT) 25 15 - 37 U/L    Alk. phosphatase 132 (H) 50 - 130 U/L    Protein, total 6.2 (L) 6.3 - 8.2 g/dL    Albumin 2.5 (L) 3.5 - 5.0 g/dL    Globulin 3.7 (H) 2.3 - 3.5 g/dL    A-G Ratio 0.7 (L) 1.2 - 3.5     CBC WITH AUTOMATED DIFF    Collection Time: 04/14/22  6:14 PM   Result Value Ref Range    WBC 14.3 (H) 4.3 - 11.1 K/uL    RBC 4.37 4.05 - 5.2 M/uL    HGB 11.2 (L) 11.7 - 15.4 g/dL    HCT 35.0 (L) 35.8 - 46.3 %    MCV 80.1 79.6 - 97.8 FL    MCH 25.6 (L) 26.1 - 32.9 PG    MCHC 32.0 31.4 - 35.0 g/dL    RDW 18.4 (H) 11.9 - 14.6 %    PLATELET 910 253 - 379 K/uL    MPV 11.6 9.4 - 12.3 FL    ABSOLUTE NRBC 0.00 0.0 - 0.2 K/uL    DF AUTOMATED      NEUTROPHILS 85 (H) 43 - 78 %    LYMPHOCYTES 7 (L) 13 - 44 %    MONOCYTES 6 4.0 - 12.0 %    EOSINOPHILS 0 (L) 0.5 - 7.8 %    BASOPHILS 0 0.0 - 2.0 %    IMMATURE GRANULOCYTES 1 0.0 - 5.0 %    ABS. NEUTROPHILS 12.2 (H) 1.7 - 8.2 K/UL    ABS. LYMPHOCYTES 1.0 0.5 - 4.6 K/UL    ABS. MONOCYTES 0.9 0.1 - 1.3 K/UL    ABS. EOSINOPHILS 0.0 0.0 - 0.8 K/UL    ABS. BASOPHILS 0.0 0.0 - 0.2 K/UL    ABS. IMM. GRANS. 0.2 0.0 - 0.5 K/UL   FIBRINOGEN    Collection Time: 22  6:14 PM   Result Value Ref Range    Fibrinogen 289 190 - 501 mg/dL   PROTHROMBIN TIME + INR    Collection Time: 22  6:14 PM   Result Value Ref Range    Prothrombin time 12.5 (L) 12.6 - 14.5 sec    INR 0.9     PTT    Collection Time: 22  6:14 PM   Result Value Ref Range    aPTT 24.8 24.1 - 35.1 SEC   D DIMER    Collection Time: 22  6:14 PM   Result Value Ref Range    D DIMER 8.44 (H) <0.56 ug/ml(FEU)   TEG GLOBAL HEMOSTASIS WITH LYSIS    Collection Time: 22  6:14 PM   Result Value Ref Range    Citrated Kaolin R-Time 2.2 (L) 4.6 - 9.1 MINS    Citrated Kaolin LY30 0 0.0 - 2.6 %    Citrated RapidTEG Maximum Amplitude 60.2 52 - 70 mm    Citrated Functional Fibrinogen Maximum Amplitude 17.0 15 - 32 mm   CBC WITH AUTOMATED DIFF    Collection Time: 04/15/22  6:27 AM   Result Value Ref Range    WBC 15.5 (H) 4.3 - 11.1 K/uL    RBC 3.82 (L) 4.05 - 5.2 M/uL    HGB 9.8 (L) 11.7 - 15.4 g/dL    HCT 30.1 (L) 35.8 - 46.3 %    MCV 78.8 (L) 79.6 - 97.8 FL    MCH 25.7 (L) 26.1 - 32.9 PG    MCHC 32.6 31.4 - 35.0 g/dL    RDW 18.1 (H) 11.9 - 14.6 %    PLATELET 704 252 - 220 K/uL    MPV 11.2 9.4 - 12.3 FL    ABSOLUTE NRBC 0.00 0.0 - 0.2 K/uL    DF AUTOMATED      NEUTROPHILS 76 43 - 78 %    LYMPHOCYTES 12 (L) 13 - 44 %    MONOCYTES 11 4.0 - 12.0 %    EOSINOPHILS 0 (L) 0.5 - 7.8 %    BASOPHILS 0 0.0 - 2.0 %    IMMATURE GRANULOCYTES 1 0.0 - 5.0 %    ABS. NEUTROPHILS 11.7 (H) 1.7 - 8.2 K/UL    ABS. LYMPHOCYTES 1.9 0.5 - 4.6 K/UL    ABS. MONOCYTES 1.6 (H) 0.1 - 1.3 K/UL    ABS. EOSINOPHILS 0.0 0.0 - 0.8 K/UL    ABS. BASOPHILS 0.0 0.0 - 0.2 K/UL    ABS. IMM. GRANS. 0.2 0.0 - 0.5 K/UL         PPD #1      Pt is breast feeding. No issues or complaints today. Stable. Vag pack removed, approx 100 mL removed from Bakri. Will cont slow release throughout the day today and monitor bleeding.  Routine PP care Marycarmen Gtz MD  9:55 AM  04/15/22

## 2022-04-16 VITALS
TEMPERATURE: 98.2 F | HEART RATE: 64 BPM | WEIGHT: 198 LBS | BODY MASS INDEX: 37.38 KG/M2 | HEIGHT: 61 IN | RESPIRATION RATE: 15 BRPM | SYSTOLIC BLOOD PRESSURE: 100 MMHG | DIASTOLIC BLOOD PRESSURE: 60 MMHG | OXYGEN SATURATION: 98 %

## 2022-04-16 PROCEDURE — 74011250637 HC RX REV CODE- 250/637: Performed by: OBSTETRICS & GYNECOLOGY

## 2022-04-16 RX ORDER — IBUPROFEN 600 MG/1
600 TABLET ORAL
Qty: 60 TABLET | Refills: 0 | Status: SHIPPED | OUTPATIENT
Start: 2022-04-16

## 2022-04-16 RX ADMIN — MISOPROSTOL 200 MCG: 200 TABLET ORAL at 00:43

## 2022-04-16 RX ADMIN — IBUPROFEN 800 MG: 800 TABLET ORAL at 09:01

## 2022-04-16 NOTE — PROGRESS NOTES
[facility]  [address] [place] [address], [address]  [address] [facility], [place], Carmelo Kwok, [facility]    Patient is S/P vaginal delivery at 39 1/7 weeks, IOL for postdates, PPH. No complaints today. Lochia < menses. No GI/ issues. No F/C. VITALS  Patient Vitals for the past 24 hrs:   Temp Pulse Resp BP SpO2   22 0725 98.2 °F (36.8 °C) 64 15 100/60 98 %   04/15/22 2309 98.5 °F (36.9 °C) 67 15 (!) 96/55 98 %   04/15/22 1526 98 °F (36.7 °C) 72 17 107/72 98 %   04/15/22 1206 98.4 °F (36.9 °C) 80 17 100/60 98 %        CV - RRR  LUNGS - CTA bilaterally  ABD - soft, approp tend, FF below umbilicus  EXT - tr edema bilaterally          Labs:  No results found for this or any previous visit (from the past 24 hour(s)). PPD #2      Pt is breast feeding. Nml lochia since Bakri D/C'd. No issues or complaints today. Stable.   Routine PP instructions      Nioclle Francois MD  9:37 AM  22

## 2022-04-16 NOTE — DISCHARGE INSTRUCTIONS
Patient Education        After Your Delivery (the Postpartum Period): Care Instructions  Overview     Congratulations on the birth of your baby. Like pregnancy, the  period can be a time of excitement, rosangela, and exhaustion. You may look at your wondrous little baby and feel happy. You may also be overwhelmed by your new sleep hours and new responsibilities. At first, babies often sleep during the days and are awake at night. They do not have a pattern or routine. They may make sudden gasps, jerk themselves awake, or look like they have crossed eyes. These are all normal, and they may even make you smile. In these first weeks after delivery, try to take good care of yourself. It may take 4 to 6 weeks to feel like yourself again, and possibly longer if you had a  birth. You will likely feel very tired for several weeks. Your days will be full of ups and downs, but lots of rosangela as well. Follow-up care is a key part of your treatment and safety. Be sure to make and go to all appointments, and call your doctor if you are having problems. It's also a good idea to know your test results and keep a list of the medicines you take. How can you care for yourself at home? Take care of your body after delivery  · Use pads instead of tampons for the bloody flow that may last as long as 2 weeks. · Ease cramps with ibuprofen (Advil, Motrin). · Ease soreness of hemorrhoids and the area between your vagina and rectum with ice compresses or witch hazel pads. · Ease constipation by drinking lots of fluid and eating high-fiber foods. Ask your doctor about over-the-counter stool softeners. · Cleanse yourself with a gentle squeeze of warm water from a bottle instead of wiping with toilet paper. · Take a sitz bath in warm water several times a day. · Wear a good nursing bra. Ease sore and swollen breasts with warm, wet washcloths. · If you aren't breastfeeding, use ice rather than heat for breast soreness.   · Your period may not start for several months if you are breastfeeding. You may bleed more, and longer at first, than you did before you got pregnant. · Wait until you are healed (about 4 to 6 weeks) before you have sex. Ask your doctor when it is okay for you to have sex. · Try not to travel with your baby for 5 or 6 weeks. If you take a long car trip, make frequent stops to walk around and stretch. Avoid exhaustion  · Rest every day. Try to nap when your baby naps. · Ask another adult to be with you for a few days after delivery. · Plan for  if you have other children. · Stay flexible so you can eat at odd hours and sleep when you need to. Both you and your baby are making new schedules. · Plan small trips to get out of the house. Change can make you feel less tired. · Ask for help with housework, cooking, and shopping. Remind yourself that your job is to care for your baby. Know about help for postpartum depression  · \"Baby blues\" are common for the first 1 to 2 weeks after birth. You may cry or feel sad or irritable for no reason. · Rest whenever you can. Being tired makes it harder to handle your emotions. · Go for walks with your baby. · Talk to your partner, friends, and family about your feelings. · If your symptoms last for more than a few weeks, or if you feel very depressed, ask your doctor for help. · Postpartum depression can be treated. Support groups and counseling can help. Sometimes medicine can also help. Stay healthy  · Eat healthy foods so you have more energy. · If you breastfeed, avoid drugs. If you quit smoking during pregnancy, try to stay smoke-free. If you choose to have a drink now and then, have only one drink, and limit the number of occasions that you have a drink. Wait to breastfeed at least 2 hours after you have a drink to reduce the amount of alcohol the baby may get in the milk. · Start daily exercise after 4 to 6 weeks, but rest when you feel tired.   · Learn exercises to tone your belly. Do Kegel exercises to regain strength in your pelvic muscles. You can do these exercises while you stand or sit. ? Squeeze the same muscles you would use to stop your urine. Your belly and thighs should not move. ? Hold the squeeze for 3 seconds, and then relax for 3 seconds. ? Start with 3 seconds. Then add 1 second each week until you are able to squeeze for 10 seconds. ? Repeat the exercise 10 to 15 times for each session. Do three or more sessions each day. · Find a class for you and your baby that has an exercise time. · If you had a  birth, give yourself a bit more time before you exercise, and be careful. When should you call for help? Share this information with your partner, family, or a friend. They can help you watch for warning signs. Call 911  anytime you think you may need emergency care. For example, call if:    · You have thoughts of harming yourself, your baby, or another person.     · You passed out (lost consciousness).     · You have chest pain, are short of breath, or cough up blood.     · You have a seizure. Call your doctor now or seek immediate medical care if:    · You have signs of hemorrhage (too much bleeding), such as:  ? Heavy vaginal bleeding. This means that you are soaking through one or more pads in an hour. Or you pass blood clots bigger than an egg. ? Feeling dizzy or lightheaded, or you feel like you may faint. ? Feeling so tired or weak that you cannot do your usual activities. ? A fast or irregular heartbeat. ? New or worse belly pain.     · You have signs of infection, such as:  ? A fever. ? Vaginal discharge that smells bad.  ? New or worse belly pain.     · You have symptoms of a blood clot in your leg (called a deep vein thrombosis), such as:  ? Pain in the calf, back of the knee, thigh, or groin. ?  Redness and swelling in your leg or groin.     · You have signs of preeclampsia, such as:  ? Sudden swelling of your face, hands, or feet. ? New vision problems (such as dimness, blurring, or seeing spots). ? A severe headache. Watch closely for changes in your health, and be sure to contact your doctor if:    · Your vaginal bleeding isn't decreasing.     · You feel sad, anxious, or hopeless for more than a few days.     · You are having problems with your breasts or breastfeeding. Where can you learn more? Go to http://www.quintana.com/  Enter A461 in the search box to learn more about \"After Your Delivery (the Postpartum Period): Care Instructions. \"  Current as of: June 16, 2021               Content Version: 13.2  © 8771-3271 [employer]. Care instructions adapted under license by [employer] (which disclaims liability or warranty for this information). If you have questions about a medical condition or this instruction, always ask your healthcare professional. [place] [address] any warranty or liability for your use of this information.

## 2022-04-16 NOTE — PROGRESS NOTES
Discharge instructions completed. Patient voiced understanding. Prescription sent electronically.   Patient discharged home via wheelchair

## 2022-04-18 LAB
ABO + RH BLD: NORMAL
BLD PROD TYP BPU: NORMAL
BLOOD GROUP ANTIBODIES SERPL: NORMAL
BPU ID: NORMAL
CROSSMATCH RESULT,%XM: NORMAL
SPECIMEN EXP DATE BLD: NORMAL
STATUS OF UNIT,%ST: NORMAL
UNIT DIVISION, %UDIV: 0

## 2022-04-19 NOTE — DISCHARGE SUMMARY
[place] [address], [address]  [address] [facility] Ari Boyer MD, Latha Pratt, [facility]  Date of Admission:  2022  7:21 AM  Date of Discharge:  2022 11:12 AM    Encounter Diagnoses   Name Primary?  Vaginal delivery     41 weeks gestation of pregnancy     Encounter for induction of labor     Obesity affecting pregnancy in second trimester     Supervision of high risk pregnancy in second trimester     Polyhydramnios in second trimester, single or unspecified fetus     Uterine atony     Other immediate postpartum hemorrhage         Mala Whitfield 28 y.o. Natalia Starks  presented at 41w1d for induction for postdates. .  Pt had  without incident. See delivery note for all delivery details. Pt's PP course was complicated by PPH that required banjo curettage and Bakri placement. On day of D/C, she was ambulating well, afebrile, with lochia < menses. She was discharged home with medications as below. Pt was breast feeding on discharge. Routine PP instructions given to patient.   She is to follow up with [facility] in 6 weeks for PP exam.    Discharge Medication List as of 2022 10:29 AM      START taking these medications    Details   ibuprofen (MOTRIN) 600 mg tablet Take 1 Tablet by mouth every six (6) hours as needed for Pain., Normal, Disp-60 Tablet, R-0         CONTINUE these medications which have NOT CHANGED    Details   prenatal multivit-ca-min-fe-fa tab Take  by mouth., Juan Pablo Meza MD  10:25 AM  22

## (undated) DEVICE — PERI-PAD,MODERATE: Brand: CURITY

## (undated) DEVICE — CATHETER URETH 16FR PVC 2 W F

## (undated) DEVICE — GOWN,REINF,POLY,ECL,PP SLV,XL: Brand: MEDLINE

## (undated) DEVICE — SOLUTION IV 1000ML 0.9% SOD CHL

## (undated) DEVICE — DRAPE,UNDERBUTTOCKS,PCH,STERILE: Brand: MEDLINE

## (undated) DEVICE — LITHOTOMY: Brand: MEDLINE INDUSTRIES, INC.

## (undated) DEVICE — TELFA NON-ADHERENT ABSORBENT DRESSING: Brand: TELFA

## (undated) DEVICE — CARDINAL HEALTH FLEXIBLE LIGHT HANDLE COVER: Brand: CARDINAL HEALTH

## (undated) DEVICE — DRAPE TWL SURG 16X26IN BLU ORB04] ALLCARE INC]

## (undated) DEVICE — TRAY PREP DRY W/ PREM GLV 2 APPL 6 SPNG 2 UNDPD 1 OVERWRAP

## (undated) DEVICE — STERILE POLYISOPRENE POWDER-FREE SURGICAL GLOVES: Brand: PROTEXIS

## (undated) DEVICE — CONTAINER SPEC FRMLN 120ML --